# Patient Record
(demographics unavailable — no encounter records)

---

## 2025-01-07 NOTE — REVIEW OF SYSTEMS
[Eyesight Problems] : eyesight problems [Dry Eyes] : dryness of the eyes [Poor quality erections] : Poor quality erections [Wake up at night to urinate  How many times?  ___] : wakes up to urinate [unfilled] times during the night [Slow urine stream] : slow urine stream [Bladder fullness after urinating] : bladder fullness after urinating [Negative] : Heme/Lymph

## 2025-01-07 NOTE — ASSESSMENT
[FreeTextEntry1] : 75 yo M with BPH with LUTS, continue finasteride.  For nocturia, continue TOviaz PRN For ED, Rx tadalafil.  RTO 12 months or sooner PRN  Referred to Dr Dru Denny for discussion of RIH repair

## 2025-01-07 NOTE — PHYSICAL EXAM
[General Appearance - Well Developed] : well developed [General Appearance - Well Nourished] : well nourished [Normal Appearance] : normal appearance [Well Groomed] : well groomed [General Appearance - In No Acute Distress] : no acute distress [Abdomen Soft] : soft [Abdomen Tenderness] : non-tender [Costovertebral Angle Tenderness] : no ~M costovertebral angle tenderness [Urethral Meatus] : meatus normal [Urinary Bladder Findings] : the bladder was normal on palpation [Scrotum] : the scrotum was normal [Testes Mass (___cm)] : there were no testicular masses [Prostate Tenderness] : the prostate was not tender [No Prostate Nodules] : no prostate nodules [Prostate Size ___ gm] : prostate size [unfilled] gm [Edema] : no peripheral edema [] : no respiratory distress [Respiration, Rhythm And Depth] : normal respiratory rhythm and effort [Exaggerated Use Of Accessory Muscles For Inspiration] : no accessory muscle use [Oriented To Time, Place, And Person] : oriented to person, place, and time [Affect] : the affect was normal [Not Anxious] : not anxious [Mood] : the mood was normal [Normal Station and Gait] : the gait and station were normal for the patient's age [No Focal Deficits] : no focal deficits [No Palpable Adenopathy] : no palpable adenopathy [FreeTextEntry1] : palpable RIH

## 2025-01-07 NOTE — HISTORY OF PRESENT ILLNESS
[FreeTextEntry1] : 73 year old man seen 11/18/2021 with complaint of frequency, urgency, weak stream, sensation of incomplete bladder emptying as well as  ED. This began years ago.  It is moderate in severity. Nothing makes the symptoms better, nothing makes sx worse. He is on finasteride. He  It is associated with nothing. No hematuria, no dysuria. No incontinence. No fevers, no chills, no nausea, no vomiting, no flank pain.  No family history contributory to BPH with LUTS or ED..   11/15/2022: Patient presents for follow up. He reports ED responsive to sildenafil. LUTS well controlled with finasteride and PRN Toviaz. No new or acute  complaints. PSA 1.5  01/02/2024: Patient presents for follow up. He reports nocturia up to 2-3x/night, still not bothersome. Takes Toviaz PRN, like when he is on vacation. Still on finasteride 2.5 mg 3x/week. No other new or bothersome LUTS. Reports PSA by PCP has been stable. Good erections with sildenafil.   01/07/2025: Patient presents for follow up. He denies any new or bothersome LUTS. Still taking finasteride as above. Uses Toviaz on vacation only. Good erections on sidlenafil but is interested in trying tadalafil. Reports RIGHT groin pain.

## 2025-02-20 NOTE — PHYSICAL EXAM
[Alert] : alert [Oriented x 3] : ~L oriented x 3 [Well Nourished] : well nourished [Full Body Skin Exam Performed] : performed [FreeTextEntry3] : A full skin exam was performed including the scalp, face (including lips, ears, nose and eyes), neck, chest, abdomen, back, buttocks, upper extremities and lower extremities.  The patient declined examination of the genitalia.   The exam revealed the following benign growths: [place] pigmented nevi. Seborrheic keratoses. Lentigines. Cherry angioma.  keratotic papules - right glabellar region, left nose and right nose.

## 2025-02-20 NOTE — ASSESSMENT
[FreeTextEntry1] : A complete skin examination was performed.  There is no evidence of skin cancer.  We discussed the importance of photoprotection, including the use of hats, protective clothing and sunscreens with an SPF of at least 30.  Sun avoidance was also discussed.  The ABCDE's of melanoma was discussed.  Regular skin exams recommended.  AK's - face. Patient to f/u for cryosurgery - declined today, as he has meetings scheduled.

## 2025-02-20 NOTE — HISTORY OF PRESENT ILLNESS
[FreeTextEntry1] : Patient presents for skin examination. [de-identified] : Notes rough lesion of the right forehead.

## 2025-04-01 NOTE — PHYSICAL EXAM
[FreeTextEntry3] : keratotic papules, right glabellar region, right nose, left nose and left temple.